# Patient Record
Sex: MALE | Race: BLACK OR AFRICAN AMERICAN | Employment: FULL TIME | URBAN - METROPOLITAN AREA
[De-identification: names, ages, dates, MRNs, and addresses within clinical notes are randomized per-mention and may not be internally consistent; named-entity substitution may affect disease eponyms.]

---

## 2017-10-19 ENCOUNTER — APPOINTMENT (OUTPATIENT)
Dept: GENERAL RADIOLOGY | Facility: HOSPITAL | Age: 22
End: 2017-10-19
Attending: EMERGENCY MEDICINE
Payer: COMMERCIAL

## 2017-10-19 ENCOUNTER — APPOINTMENT (OUTPATIENT)
Dept: CT IMAGING | Facility: HOSPITAL | Age: 22
End: 2017-10-19
Attending: EMERGENCY MEDICINE
Payer: COMMERCIAL

## 2017-10-19 ENCOUNTER — HOSPITAL ENCOUNTER (EMERGENCY)
Facility: HOSPITAL | Age: 22
Discharge: HOME | End: 2017-10-20
Attending: EMERGENCY MEDICINE
Payer: COMMERCIAL

## 2017-10-19 DIAGNOSIS — R06.00 DYSPNEA, UNSPECIFIED TYPE: Primary | ICD-10-CM

## 2017-10-19 PROCEDURE — 85025 COMPLETE CBC W/AUTO DIFF WBC: CPT | Performed by: EMERGENCY MEDICINE

## 2017-10-19 PROCEDURE — 85379 FIBRIN DEGRADATION QUANT: CPT | Performed by: EMERGENCY MEDICINE

## 2017-10-19 PROCEDURE — 71020 XR CHEST PA + LAT CHEST (CPT=71020): CPT | Performed by: EMERGENCY MEDICINE

## 2017-10-19 PROCEDURE — 36415 COLL VENOUS BLD VENIPUNCTURE: CPT

## 2017-10-19 PROCEDURE — 99285 EMERGENCY DEPT VISIT HI MDM: CPT

## 2017-10-19 PROCEDURE — 71260 CT THORAX DX C+: CPT | Performed by: EMERGENCY MEDICINE

## 2017-10-19 PROCEDURE — 80048 BASIC METABOLIC PNL TOTAL CA: CPT | Performed by: EMERGENCY MEDICINE

## 2017-10-19 RX ORDER — HYDROCODONE BITARTRATE AND ACETAMINOPHEN 5; 325 MG/1; MG/1
1 TABLET ORAL ONCE
Status: COMPLETED | OUTPATIENT
Start: 2017-10-19 | End: 2017-10-19

## 2017-10-19 RX ORDER — ATORVASTATIN CALCIUM 10 MG/1
10 TABLET, FILM COATED ORAL NIGHTLY
COMMUNITY

## 2017-10-20 VITALS
HEART RATE: 109 BPM | WEIGHT: 315 LBS | TEMPERATURE: 99 F | RESPIRATION RATE: 16 BRPM | OXYGEN SATURATION: 98 % | DIASTOLIC BLOOD PRESSURE: 69 MMHG | SYSTOLIC BLOOD PRESSURE: 123 MMHG

## 2017-10-20 RX ORDER — HYDROCODONE BITARTRATE AND ACETAMINOPHEN 5; 325 MG/1; MG/1
1 TABLET ORAL ONCE
Status: COMPLETED | OUTPATIENT
Start: 2017-10-20 | End: 2017-10-20

## 2017-10-20 NOTE — ED NOTES
Pt asleep in bed, easily awakened. Pt states pain is a \"20 out of ten\". Medicated for pain at this time.

## 2017-10-20 NOTE — ED INITIAL ASSESSMENT (HPI)
Pt states been having shortness of breath on and off for two days. This episode started tonight while he was visiting his grandmother in the hospital. Denies chest any recent chest pain or leg pain or history of blood clots.

## 2017-10-20 NOTE — ED PROVIDER NOTES
Patient Seen in: [facility] AND [facility] Emergency Department    History   Patient presents with:  Dyspnea YUSEF SOB (respiratory)    Stated Complaint: SHORTNESS OF BREATH WHILE VISITING GRANDMOTHER UPSTAIRS INPATIENT    HPI    Patient presents because he has ju otherwise stated in HPI.     Physical Exam   ED Triage Vitals [10/19/17 1907]  BP: 137/86  Pulse: 104  Resp: 18  Temp: 99.4 °F (37.4 °C)  Temp src: Oral  SpO2: 100 %  O2 Device: None (Room air)    Current:/83   Pulse 111   Temp 98.5 °F (36.9 °C) (Oral Result Value    Glucose 173 (*)     All other components within normal limits   D-DIMER - Abnormal; Notable for the following:     D-Dimer 0.50 (*)     All other components within normal limits   CBC W/ DIFFERENTIAL - Abnormal; Notable for the following:

## 2017-10-20 NOTE — ED NOTES
Pt states been feeling short of breath. States he thinks he has the flu, chills and hot flashes. No fevers, lungs are clear. Denies cough, fever, pain.

## 2021-04-21 ENCOUNTER — LAB ENCOUNTER (OUTPATIENT)
Dept: LAB | Facility: HOSPITAL | Age: 26
End: 2021-04-21
Attending: FAMILY MEDICINE
Payer: COMMERCIAL

## 2021-04-21 DIAGNOSIS — N46.9 INFERTILITY MALE: Primary | ICD-10-CM

## 2021-04-21 PROCEDURE — 89320 SEMEN ANAL VOL/COUNT/MOT: CPT

## 2023-04-22 ENCOUNTER — LAB ENCOUNTER (OUTPATIENT)
Dept: LAB | Facility: HOSPITAL | Age: 28
End: 2023-04-22
Attending: FAMILY MEDICINE
Payer: COMMERCIAL

## 2023-04-22 DIAGNOSIS — N46.9 MALE INFERTILITY: Primary | ICD-10-CM

## 2023-04-22 LAB
PH SMN: 8.5 [PH] (ref 7.2–8.3)
SPECIMEN VOL SMN: 1.5 ML (ref 1.5–6)
SPERM # SMN: 11.5 MILL/ML (ref 15–150)
SPERM IMMOTILE NFR SMN: 4 %
SPERM IMMOTILE NFR SMN: 96 %
SPERM PROG NFR SMN: 0 %
SPERM VIABLE NFR SMN: 22 % (ref 58–100)
TESTOST SERPL-MCNC: 199.16 NG/DL
VISC SMN QL: NORMAL

## 2023-04-22 PROCEDURE — 36415 COLL VENOUS BLD VENIPUNCTURE: CPT

## 2023-04-22 PROCEDURE — 84403 ASSAY OF TOTAL TESTOSTERONE: CPT

## 2023-04-22 PROCEDURE — 89320 SEMEN ANAL VOL/COUNT/MOT: CPT

## 2024-11-19 ENCOUNTER — HOSPITAL ENCOUNTER (EMERGENCY)
Age: 29
Discharge: HOME | End: 2024-11-19
Attending: EMERGENCY MEDICINE
Payer: COMMERCIAL

## 2024-11-19 VITALS
WEIGHT: 260 LBS | SYSTOLIC BLOOD PRESSURE: 136 MMHG | HEIGHT: 71 IN | TEMPERATURE: 98 F | OXYGEN SATURATION: 100 % | HEART RATE: 109 BPM | RESPIRATION RATE: 18 BRPM | BODY MASS INDEX: 36.4 KG/M2 | DIASTOLIC BLOOD PRESSURE: 86 MMHG

## 2024-11-19 DIAGNOSIS — N50.819 PAIN IN TESTICLE, UNSPECIFIED LATERALITY: Primary | ICD-10-CM

## 2024-11-19 LAB
BILIRUB UR QL STRIP.AUTO: NEGATIVE
CLARITY UR REFRACT.AUTO: CLEAR
COLOR UR AUTO: YELLOW
GLUCOSE BLD-MCNC: 81 MG/DL (ref 70–99)
GLUCOSE UR STRIP.AUTO-MCNC: NEGATIVE MG/DL
KETONES UR STRIP.AUTO-MCNC: NEGATIVE MG/DL
LEUKOCYTE ESTERASE UR QL STRIP.AUTO: NEGATIVE
NITRITE UR QL STRIP.AUTO: NEGATIVE
PH UR STRIP.AUTO: 8 [PH] (ref 5–8)
PROT UR STRIP.AUTO-MCNC: NEGATIVE MG/DL
RBC UR QL AUTO: NEGATIVE
SP GR UR STRIP.AUTO: 1.02 (ref 1–1.03)
UROBILINOGEN UR STRIP.AUTO-MCNC: 1 MG/DL

## 2024-11-19 PROCEDURE — 87491 CHLMYD TRACH DNA AMP PROBE: CPT | Performed by: EMERGENCY MEDICINE

## 2024-11-19 PROCEDURE — 87591 N.GONORRHOEAE DNA AMP PROB: CPT | Performed by: EMERGENCY MEDICINE

## 2024-11-19 PROCEDURE — 99283 EMERGENCY DEPT VISIT LOW MDM: CPT

## 2024-11-19 PROCEDURE — 82962 GLUCOSE BLOOD TEST: CPT

## 2024-11-19 PROCEDURE — 81003 URINALYSIS AUTO W/O SCOPE: CPT | Performed by: EMERGENCY MEDICINE

## 2024-11-19 RX ORDER — TIRZEPATIDE 15 MG/.5ML
INJECTION, SOLUTION SUBCUTANEOUS
COMMUNITY

## 2024-11-19 NOTE — ED PROVIDER NOTES
Patient Seen in: [facility] In [place]      History     Chief Complaint   Patient presents with    Urinary Symptoms     Stated Complaint: urinary complaints    Subjective:     HPI    29-year-old male with diabetes (metformin) who came in today with a complaint of discomfort in his testicles.  He has been noticing this for over a week and decided to seek medical attention. He also mentioned that he had diabetes, but it has been resolved following weight loss surgery. He lost about 100 pounds, going from 361 to 261. He is currently taking Mounjaro to maintain his weight. He denies any abdominal pain.    Objective:   Past Medical History:    Diabetes (HCC)              Past Surgical History:   Procedure Laterality Date    Lap gastric bypass/cinthia-en-y                  Social History     Socioeconomic History    Marital status:    Tobacco Use    Smoking status: Never    Smokeless tobacco: Never   Vaping Use    Vaping status: Never Used   Substance and Sexual Activity    Alcohol use: Never    Drug use: Never     Social Drivers of Health     Financial Resource Strain: Low Risk  (6/4/2024)    Received from [facility]    Overall Financial Resource Strain (CARDIA)     Difficulty of Paying Living Expenses: Not hard at all   Food Insecurity: No Food Insecurity (6/4/2024)    Received from [facility]    Hunger Vital Sign     Worried About Running Out of Food in the Last Year: Never true     Ran Out of Food in the Last Year: Never true   Transportation Needs: No Transportation Needs (6/4/2024)    Received from [facility]    PRAPARE - Transportation     Lack of Transportation (Medical): No     Lack of Transportation (Non-Medical): No   Housing Stability: Unknown (6/4/2024)    Received from [facility]    Housing Stability Vital Sign     Unable to Pay for Housing in the Last Year: No     Unstable Housing in the Last  Year: No              Review of Systems    Positive for stated complaint: urinary complaints  Other systems are as noted in HPI.  Constitutional and vital signs reviewed.      All other systems reviewed and negative except as noted above.    Physical Exam     ED Triage Vitals [11/19/24 0833]   /86   Pulse 109   Resp 18   Temp 98.3 °F (36.8 °C)   Temp src    SpO2 100 %   O2 Device None (Room air)       Current:/86   Pulse 109   Temp 98.3 °F (36.8 °C)   Resp 18   Ht 180.3 cm (5' 11\")   Wt 117.9 kg   SpO2 100%   BMI 36.26 kg/m²       General:  Vitals as listed.  No acute distress   HEENT: Sclerae anicteric.  Conjunctivae show no pallor.  Oropharynx clear, mucous membranes moist   Lungs: good air exchange  Abdomen: Soft and nontender.  No abdominal masses.  No peritoneal signs  : Testes are small, symmetric and nontender  Extremities: no edema, normal peripheral pulses   Neuro: Alert oriented and nonfocal      ED Course     Labs Reviewed   URINALYSIS WITH CULTURE REFLEX - Abnormal; Notable for the following components:       Result Value    Urobilinogen Urine 1.0 (*)     All other components within normal limits   POCT GLUCOSE - Normal   CHLAMYDIA/GONOCOCCUS, GROVER       ED COURSE and MDM     I reviewed prior external notes including evaluation by Fitz FONTENOT written 11/7/2024 regarding obesity treatment.    To take Tylenol/ibuprofen for discomfort.    I have discussed with the patient the results of testing, differential diagnosis, and treatment plan. They expressed clear understanding of these instructions and agrees to the plan provided.    Disposition and Plan     Clinical Impression:  1. Pain in testicle, unspecified laterality         Disposition:  Discharge  11/19/2024  9:23 am    Follow-up:  Wilner Barreto  [address] [place] [id number]  [phone]    Follow up in 1 week(s)  As needed        Medications Prescribed:  Discharge Medication List as of 11/19/2024  9:24 AM

## 2024-11-19 NOTE — ED INITIAL ASSESSMENT (HPI)
Pt reports coming in due to testicular pain for 1 week and also urinary frequency that started today.

## 2024-11-20 ENCOUNTER — HOSPITAL ENCOUNTER (EMERGENCY)
Facility: HOSPITAL | Age: 29
Discharge: HOME | End: 2024-11-20
Attending: EMERGENCY MEDICINE
Payer: COMMERCIAL

## 2024-11-20 ENCOUNTER — APPOINTMENT (OUTPATIENT)
Dept: ULTRASOUND IMAGING | Facility: HOSPITAL | Age: 29
End: 2024-11-20
Attending: EMERGENCY MEDICINE
Payer: COMMERCIAL

## 2024-11-20 VITALS
RESPIRATION RATE: 25 BRPM | TEMPERATURE: 97 F | SYSTOLIC BLOOD PRESSURE: 124 MMHG | OXYGEN SATURATION: 100 % | DIASTOLIC BLOOD PRESSURE: 70 MMHG | HEART RATE: 98 BPM

## 2024-11-20 DIAGNOSIS — N50.812 TESTICULAR PAIN, LEFT: Primary | ICD-10-CM

## 2024-11-20 DIAGNOSIS — N44.2 TESTICULAR CYST: ICD-10-CM

## 2024-11-20 LAB
C TRACH DNA SPEC QL NAA+PROBE: NEGATIVE
N GONORRHOEA DNA SPEC QL NAA+PROBE: NEGATIVE

## 2024-11-20 PROCEDURE — 76870 US EXAM SCROTUM: CPT | Performed by: EMERGENCY MEDICINE

## 2024-11-20 PROCEDURE — 99284 EMERGENCY DEPT VISIT MOD MDM: CPT

## 2024-11-20 PROCEDURE — 93975 VASCULAR STUDY: CPT | Performed by: EMERGENCY MEDICINE

## 2024-11-20 NOTE — ED PROVIDER NOTES
Patient Seen in: [facility] Emergency Department      History     Chief Complaint   Patient presents with    Eval-G     Stated Complaint: Testicle pain, seen at [facility] yesterday for seme.    Subjective:   HPI      29-year-old male with past medical history as below presents with pain in his testicle.  He states it started about a week ago seems to be fairly constant but waxes and wanes in intensity.He was initially able to localize the pain to the particular testicle.  He states he chronically has 1 smaller testicle on the left and thinks the penis more so on that testicle.  He denies any urinary symptoms.  He states he went to [place] ED yesterday and had a urine test which was negative was not told what may be causing his pain.  Denies abdominal pain, nausea or vomiting.  Denies fever or chills.    Objective:     Past Medical History:    Diabetes (HCC)              Past Surgical History:   Procedure Laterality Date    Lap gastric bypass/cinthia-en-y                  Social History     Socioeconomic History    Marital status:    Tobacco Use    Smoking status: Never    Smokeless tobacco: Never   Vaping Use    Vaping status: Never Used   Substance and Sexual Activity    Alcohol use: Never    Drug use: Never     Social Drivers of Health     Financial Resource Strain: Low Risk  (6/4/2024)    Received from [facility]    Overall Financial Resource Strain (CARDIA)     Difficulty of Paying Living Expenses: Not hard at all   Food Insecurity: No Food Insecurity (6/4/2024)    Received from [facility]    Hunger Vital Sign     Worried About Running Out of Food in the Last Year: Never true     Ran Out of Food in the Last Year: Never true   Transportation Needs: No Transportation Needs (6/4/2024)    Received from [facility]    PRAPARE - Transportation     Lack of Transportation (Medical): No     Lack of Transportation (Non-Medical): No   Housing  Stability: Unknown (6/4/2024)    Received from [facility]    Housing Stability Vital Sign     Unable to Pay for Housing in the Last Year: No     Unstable Housing in the Last Year: No                  Physical Exam     ED Triage Vitals   BP 11/20/24 1001 131/81   Pulse 11/20/24 1001 106   Resp 11/20/24 1001 18   Temp 11/20/24 1001 97 °F (36.1 °C)   Temp src 11/20/24 1001 Temporal   SpO2 11/20/24 1001 100 %   O2 Device 11/20/24 1215 None (Room air)       Current Vitals:   Vital Signs  BP: 124/70  Pulse: 98  Resp: 25  Temp: 97 °F (36.1 °C)  Temp src: Temporal  MAP (mmHg): 84    Oxygen Therapy  SpO2: 100 %  O2 Device: None (Room air)        Physical Exam  Vitals and nursing note reviewed.   Constitutional:       General: He is not in acute distress.     Appearance: He is well-developed. He is not ill-appearing.   HENT:      Head: Normocephalic and atraumatic.      Mouth/Throat:      Mouth: Mucous membranes are moist.   Eyes:      General: No scleral icterus.     Extraocular Movements: Extraocular movements intact.   Genitourinary:     Comments: Mild asymmetry of testicles with left testicle slightly smaller than the right which patient states is chronic  Left testicle with mild tenderness posteriorly  No overlying skin changes  Musculoskeletal:      Cervical back: Neck supple.   Skin:     General: Skin is warm and dry.      Capillary Refill: Capillary refill takes less than 2 seconds.   Neurological:      Mental Status: He is alert and oriented to person, place, and time.      GCS: GCS eye subscore is 4. GCS verbal subscore is 5. GCS motor subscore is 6.   Psychiatric:         Mood and Affect: Mood normal.         Behavior: Behavior normal.             ED Course   Labs Reviewed - No data to display         US SCROTUM W/ DOPPLER (CPT=93975/03498)    Result Date: 11/20/2024  CONCLUSION:   1. Negative for testicular mass, torsion, or epididymo-orchitis.   2. Simple appearing cystic structure adjacent  to the superior aspect of left testicle (1.8 x 0.8 x 1.4 cm) of indeterminate etiology.  No complex components or aggressive/invasive features.  This is of questionable clinical significance.   LOCATION:  [facility]    Dictated by (CST): Flaco Juárez MD on 11/20/2024 at 1:28 PM     Finalized by (CST): Flaco Juárez MD on 11/20/2024 at 1:32 PM             [facility]      29-year-old male with past medical history as below presents with pain in his testicle.     Chart reviewed from ED visit yesterday with patient was seen for testicle pain.  UA without evidence of UTI.  Urine GC/chlamydia pending.  Instructed take Tylenol/ibuprofen for pain.    Differential includes but is not limited to epididymitis, testicular mass, hydrocele, varicocele, unlikely torsion    Independent interpretation of testicular ultrasound shows good flow to both testicles.  Radiology report reviewed as above noting a simple appearing cystic structure adjacent to the left testicle of indeterminate etiology but no complex components or aggressive/invasive features.    Instructed to follow-up with urologist for further outpatient evaluation and management.  Advised to take ibuprofen or Tylenol for pain.  Return precautions discussed.      Medical Decision Making  Amount and/or Complexity of Data Reviewed  External Data Reviewed: labs and notes.     Details: See [facility]  Radiology: ordered and independent interpretation performed. Decision-making details documented in ED Course.    Risk  OTC drugs.        Disposition and Plan     Clinical Impression:  1. Testicular pain, left    2. Testicular cyst         Disposition:  Discharge  11/20/2024  1:44 pm    Follow-up:  Wilner Barreto  [address] [facility] [address]  [phone]    Schedule an appointment as soon as possible for a visit      Yan Gentile MD  [address] [facility] [address]  [facility] [address]  [phone]    Schedule an appointment as soon as possible for a visit            Medications  Prescribed:  Current Discharge Medication List              Supplementary Documentation:

## 2024-11-20 NOTE — ED INITIAL ASSESSMENT (HPI)
C/o of testicular pain unable to clarify on effected side states its the \"smaller one\"  Seen at ped yesterday for same

## (undated) NOTE — ED AVS SNAPSHOT
Nahid Raman   MRN: [id number]    Department:  [facility] Emergency Department   Date of Visit:  10/19/2017           Disclosure     Insurance plans vary and the physician(s) referred by the ER may not be covered by your plan.  Please con CARE PHYSICIAN AT ONCE OR RETURN IMMEDIATELY TO THE EMERGENCY DEPARTMENT. If you have been prescribed any medication(s), please fill your prescription right away and begin taking the medication(s) as directed.   If you believe that any of the medications